# Patient Record
Sex: MALE | Race: WHITE
[De-identification: names, ages, dates, MRNs, and addresses within clinical notes are randomized per-mention and may not be internally consistent; named-entity substitution may affect disease eponyms.]

---

## 2017-01-03 ENCOUNTER — HOSPITAL ENCOUNTER (OUTPATIENT)
Dept: HOSPITAL 83 - RESCLI | Age: 62
Discharge: HOME | End: 2017-01-03
Attending: INTERNAL MEDICINE
Payer: COMMERCIAL

## 2017-01-03 DIAGNOSIS — K21.9: ICD-10-CM

## 2017-01-03 DIAGNOSIS — I10: Primary | ICD-10-CM

## 2017-01-03 DIAGNOSIS — M54.16: ICD-10-CM

## 2017-01-03 DIAGNOSIS — F32.9: ICD-10-CM

## 2017-01-03 DIAGNOSIS — M48.06: ICD-10-CM

## 2017-01-04 ENCOUNTER — HOSPITAL ENCOUNTER (OUTPATIENT)
Dept: HOSPITAL 83 - LAB | Age: 62
Discharge: HOME | End: 2017-01-04
Attending: INTERNAL MEDICINE
Payer: COMMERCIAL

## 2017-01-04 DIAGNOSIS — I10: ICD-10-CM

## 2017-01-04 DIAGNOSIS — K21.9: ICD-10-CM

## 2017-01-04 DIAGNOSIS — M48.06: Primary | ICD-10-CM

## 2017-01-04 DIAGNOSIS — M54.16: ICD-10-CM

## 2017-01-04 DIAGNOSIS — F32.9: ICD-10-CM

## 2017-01-04 LAB
ALBUMIN SERPL-MCNC: 3.6 GM/DL (ref 3.1–4.5)
ALP SERPL-CCNC: 88 U/L (ref 45–117)
ALT SERPL W P-5'-P-CCNC: 60 U/L (ref 12–78)
AST SERPL-CCNC: 72 IU/L (ref 3–35)
BASOPHILS # BLD AUTO: 0.3 10*3/UL (ref 0–0.1)
BASOPHILS # BLD AUTO: 2 % (ref 0–1)
BUN SERPL-MCNC: 24 MG/DL (ref 7–24)
CHLORIDE SERPL-SCNC: 105 MMOL/L (ref 98–107)
CHOLEST SERPL-MCNC: 231 MG/DL (ref ?–200)
CO2 SERPL-SCNC: 27 MMOL/L (ref 21–32)
EOSINOPHIL # BLD AUTO: 0.2 10*3/UL (ref 0–0.4)
EOSINOPHIL NFR BLD: 1 % (ref 1–4)
ERYTHROCYTE [DISTWIDTH] IN BLOOD BY AUTOMATED COUNT: 14.2 % (ref 0–14.5)
GLUCOSE SERPL-MCNC: 99 MG/DL (ref 65–99)
HCT VFR BLD AUTO: 44.9 % (ref 42–52)
HDLC SERPL-MCNC: 42 MG/DL (ref 40–60)
HGB BLD-MCNC: 14.8 G/DL (ref 14–18)
LDLC SERPL DIRECT ASSAY-MCNC: 135 MG/DL (ref 9–159)
LYMPHOCYTES # BLD AUTO: 4.8 10*3/UL (ref 1.3–4.4)
MCH RBC QN AUTO: 30.6 PG (ref 27–31)
MCHC RBC AUTO-ENTMCNC: 33 G/DL (ref 33–37)
MCV RBC AUTO: 92.8 FL (ref 80–94)
MONOCYTES # BLD AUTO: 0.8 10*3/UL (ref 0.1–1)
NEUTROPHILS # BLD AUTO: 9.9 10*3/UL (ref 2.3–7.9)
NEUTROPHILS NFR BLD AUTO: 62 % (ref 47–73)
NRBC BLD QL AUTO: 0 % (ref 0–0)
PLATELET # BLD AUTO: 328 10*3/UL (ref 130–400)
PLATELET SUFFICIENCY: NORMAL
PMV BLD AUTO: 9.7 FL (ref 9.6–12.3)
POTASSIUM SERPL-SCNC: 4.6 MMOL/L (ref 3.5–5.1)
PROT SERPL-MCNC: 8 GM/DL (ref 6.4–8.2)
RBC # BLD AUTO: 4.84 10*6/UL (ref 4.5–5.9)
RBC MORPH BLD: NORMAL
SODIUM SERPL-SCNC: 140 MMOL/L (ref 136–145)
TOTAL CELLS COUNTED: 100 #CELLS
TRIGL SERPL-MCNC: 268 MG/DL (ref ?–150)
VARIANT LYMPHS NFR BLD MANUAL: 1 % (ref 0–0)
VLDLC SERPL CALC-MCNC: 54 MG/DL (ref 6–40)
WBC NRBC COR # BLD AUTO: 16 10*3/UL (ref 4.8–10.8)

## 2017-04-03 ENCOUNTER — HOSPITAL ENCOUNTER (OUTPATIENT)
Dept: HOSPITAL 83 - LAB | Age: 62
Discharge: HOME | End: 2017-04-03
Attending: INTERNAL MEDICINE
Payer: COMMERCIAL

## 2017-04-03 DIAGNOSIS — R74.8: Primary | ICD-10-CM

## 2017-04-03 DIAGNOSIS — I10: ICD-10-CM

## 2017-04-03 LAB
ALBUMIN SERPL-MCNC: 3.7 GM/DL (ref 3.1–4.5)
ALP SERPL-CCNC: 87 U/L (ref 45–117)
ALT SERPL W P-5'-P-CCNC: 20 U/L (ref 12–78)
AST SERPL-CCNC: 14 IU/L (ref 3–35)
BUN SERPL-MCNC: 19 MG/DL (ref 7–24)
CHLORIDE SERPL-SCNC: 106 MMOL/L (ref 98–107)
CHOLEST SERPL-MCNC: 234 MG/DL (ref ?–200)
CHOLEST/HDLC SERPL: 5.6 {RATIO}
CO2 SERPL-SCNC: 26 MMOL/L (ref 21–32)
GLUCOSE SERPL-MCNC: 92 MG/DL (ref 65–99)
HDLC SERPL-MCNC: 42 MG/DL (ref 40–60)
LDLC SERPL DIRECT ASSAY-MCNC: 161 MG/DL (ref 9–159)
POTASSIUM SERPL-SCNC: 4.2 MMOL/L (ref 3.5–5.1)
PROT SERPL-MCNC: 7.7 GM/DL (ref 6.4–8.2)
SODIUM SERPL-SCNC: 138 MMOL/L (ref 136–145)
TRIGL SERPL-MCNC: 155 MG/DL (ref ?–150)
VLDLC SERPL CALC-MCNC: 31 MG/DL (ref 6–40)

## 2017-04-04 ENCOUNTER — HOSPITAL ENCOUNTER (OUTPATIENT)
Dept: HOSPITAL 83 - LAB | Age: 62
Discharge: HOME | End: 2017-04-04
Attending: EMERGENCY MEDICINE
Payer: COMMERCIAL

## 2017-04-04 DIAGNOSIS — R10.11: Primary | ICD-10-CM

## 2017-05-10 ENCOUNTER — HOSPITAL ENCOUNTER (OUTPATIENT)
Dept: HOSPITAL 83 - US | Age: 62
Discharge: HOME | End: 2017-05-10
Attending: INTERNAL MEDICINE
Payer: COMMERCIAL

## 2017-05-10 DIAGNOSIS — N28.1: ICD-10-CM

## 2017-05-10 DIAGNOSIS — I49.9: Primary | ICD-10-CM

## 2017-05-10 DIAGNOSIS — I10: ICD-10-CM

## 2017-06-27 ENCOUNTER — HOSPITAL ENCOUNTER (OUTPATIENT)
Dept: HOSPITAL 83 - RESCLI | Age: 62
Discharge: HOME | End: 2017-06-27
Attending: INTERNAL MEDICINE
Payer: COMMERCIAL

## 2017-06-27 DIAGNOSIS — M54.16: Primary | ICD-10-CM

## 2017-06-27 DIAGNOSIS — R55: ICD-10-CM

## 2017-06-27 DIAGNOSIS — E78.2: ICD-10-CM

## 2017-06-27 DIAGNOSIS — R79.89: ICD-10-CM

## 2017-06-27 DIAGNOSIS — Z88.2: ICD-10-CM

## 2017-06-27 DIAGNOSIS — Z72.0: ICD-10-CM

## 2017-06-27 DIAGNOSIS — I10: ICD-10-CM

## 2017-06-27 DIAGNOSIS — M50.20: ICD-10-CM

## 2017-06-28 ENCOUNTER — HOSPITAL ENCOUNTER (OUTPATIENT)
Dept: HOSPITAL 83 - MRI | Age: 62
Discharge: HOME | End: 2017-06-28
Attending: PSYCHIATRY & NEUROLOGY
Payer: COMMERCIAL

## 2017-06-28 DIAGNOSIS — M54.5: Primary | ICD-10-CM

## 2017-06-28 DIAGNOSIS — R20.0: ICD-10-CM

## 2017-06-28 DIAGNOSIS — M79.604: ICD-10-CM

## 2017-06-28 DIAGNOSIS — M79.605: ICD-10-CM

## 2017-12-12 ENCOUNTER — HOSPITAL ENCOUNTER (OUTPATIENT)
Dept: HOSPITAL 83 - RESCLI | Age: 62
Discharge: HOME | End: 2017-12-12
Attending: INTERNAL MEDICINE
Payer: MEDICARE

## 2017-12-12 DIAGNOSIS — I10: ICD-10-CM

## 2017-12-12 DIAGNOSIS — M51.26: ICD-10-CM

## 2017-12-12 DIAGNOSIS — R79.89: ICD-10-CM

## 2017-12-12 DIAGNOSIS — R26.2: ICD-10-CM

## 2017-12-12 DIAGNOSIS — R55: ICD-10-CM

## 2017-12-12 DIAGNOSIS — Z72.0: ICD-10-CM

## 2017-12-12 DIAGNOSIS — Z88.2: ICD-10-CM

## 2017-12-12 DIAGNOSIS — E78.2: ICD-10-CM

## 2017-12-12 DIAGNOSIS — M50.20: ICD-10-CM

## 2017-12-12 DIAGNOSIS — M54.16: Primary | ICD-10-CM

## 2017-12-12 DIAGNOSIS — Z71.6: ICD-10-CM

## 2017-12-18 ENCOUNTER — HOSPITAL ENCOUNTER (OUTPATIENT)
Dept: HOSPITAL 83 - LAB | Age: 62
Discharge: HOME | End: 2017-12-18
Attending: INTERNAL MEDICINE
Payer: MEDICARE

## 2017-12-18 DIAGNOSIS — M16.51: ICD-10-CM

## 2017-12-18 DIAGNOSIS — R79.89: ICD-10-CM

## 2017-12-18 DIAGNOSIS — M54.16: Primary | ICD-10-CM

## 2017-12-18 LAB
BUN SERPL-MCNC: 15 MG/DL (ref 7–24)
CHLORIDE SERPL-SCNC: 95 MMOL/L (ref 98–107)
CREAT SERPL-MCNC: 1.29 MG/DL (ref 0.7–1.3)
POTASSIUM SERPL-SCNC: 4.2 MMOL/L (ref 3.5–5.1)
SODIUM SERPL-SCNC: 130 MMOL/L (ref 136–145)

## 2018-04-28 ENCOUNTER — HOSPITAL ENCOUNTER (EMERGENCY)
Dept: HOSPITAL 83 - ED | Age: 63
Discharge: HOME | End: 2018-04-28
Payer: MEDICARE

## 2018-04-28 VITALS — BODY MASS INDEX: 24.33 KG/M2 | HEIGHT: 66.97 IN | WEIGHT: 155 LBS
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
## 2018-04-28 DIAGNOSIS — I10: ICD-10-CM
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
## 2018-04-28 DIAGNOSIS — Z88.0: ICD-10-CM

## 2018-04-28 DIAGNOSIS — Z79.899: ICD-10-CM

## 2018-04-28 DIAGNOSIS — R73.9: ICD-10-CM

## 2018-04-28 DIAGNOSIS — S20.362A: Primary | ICD-10-CM

## 2018-06-06 ENCOUNTER — HOSPITAL ENCOUNTER (OUTPATIENT)
Dept: HOSPITAL 83 - RESCLI | Age: 63
Discharge: HOME | End: 2018-06-06
Attending: INTERNAL MEDICINE
Payer: MEDICARE

## 2018-06-06 DIAGNOSIS — Z88.2: ICD-10-CM

## 2018-06-06 DIAGNOSIS — I10: Primary | ICD-10-CM

## 2018-06-06 DIAGNOSIS — F17.200: ICD-10-CM

## 2018-06-06 DIAGNOSIS — H61.21: ICD-10-CM

## 2018-06-06 DIAGNOSIS — F32.9: ICD-10-CM

## 2018-06-06 DIAGNOSIS — E78.2: ICD-10-CM

## 2018-06-06 DIAGNOSIS — K21.9: ICD-10-CM

## 2018-06-06 DIAGNOSIS — M54.16: ICD-10-CM

## 2018-06-11 ENCOUNTER — HOSPITAL ENCOUNTER (OUTPATIENT)
Dept: HOSPITAL 83 - MRI | Age: 63
Discharge: HOME | End: 2018-06-11
Attending: INTERNAL MEDICINE
Payer: MEDICARE

## 2018-06-11 DIAGNOSIS — R60.9: ICD-10-CM

## 2018-06-11 DIAGNOSIS — M19.031: Primary | ICD-10-CM

## 2018-06-26 ENCOUNTER — HOSPITAL ENCOUNTER (OUTPATIENT)
Dept: HOSPITAL 83 - RESCLI | Age: 63
Discharge: HOME | End: 2018-06-26
Attending: INTERNAL MEDICINE
Payer: MEDICARE

## 2018-06-26 DIAGNOSIS — F17.210: ICD-10-CM

## 2018-06-26 DIAGNOSIS — M15.9: ICD-10-CM

## 2018-06-26 DIAGNOSIS — K21.9: ICD-10-CM

## 2018-06-26 DIAGNOSIS — G62.9: ICD-10-CM

## 2018-06-26 DIAGNOSIS — M67.40: ICD-10-CM

## 2018-06-26 DIAGNOSIS — Z53.20: ICD-10-CM

## 2018-06-26 DIAGNOSIS — I10: Primary | ICD-10-CM

## 2018-06-26 DIAGNOSIS — Z88.2: ICD-10-CM

## 2018-06-26 DIAGNOSIS — M54.16: ICD-10-CM

## 2018-06-27 ENCOUNTER — HOSPITAL ENCOUNTER (OUTPATIENT)
Dept: HOSPITAL 83 - LAB | Age: 63
Discharge: HOME | End: 2018-06-27
Attending: ORTHOPAEDIC SURGERY
Payer: MEDICARE

## 2018-06-27 DIAGNOSIS — M19.031: Primary | ICD-10-CM

## 2018-06-27 DIAGNOSIS — G62.9: ICD-10-CM

## 2018-06-27 DIAGNOSIS — I10: ICD-10-CM

## 2018-06-27 LAB
ALBUMIN SERPL-MCNC: 3.9 GM/DL (ref 3.1–4.5)
ALP SERPL-CCNC: 94 U/L (ref 45–117)
ALT SERPL W P-5'-P-CCNC: 45 U/L (ref 12–78)
AST SERPL-CCNC: 28 IU/L (ref 3–35)
BUN SERPL-MCNC: 15 MG/DL (ref 7–24)
BURR CELLS BLD QL SMEAR: (no result)
CHLORIDE SERPL-SCNC: 105 MMOL/L (ref 98–107)
CHOLEST SERPL-MCNC: 251 MG/DL (ref ?–200)
CREAT SERPL-MCNC: 1.29 MG/DL (ref 0.7–1.3)
ERYTHROCYTE [DISTWIDTH] IN BLOOD BY AUTOMATED COUNT: 14 % (ref 0–14.5)
HCT VFR BLD AUTO: 45.5 % (ref 42–52)
HDLC SERPL-MCNC: 54 MG/DL (ref 40–60)
HGB BLD-MCNC: 15.4 G/DL (ref 14–18)
LDLC SERPL DIRECT ASSAY-MCNC: 174 MG/DL (ref 9–159)
MCH RBC QN AUTO: 30.7 PG (ref 27–31)
MCHC RBC AUTO-ENTMCNC: 33.8 G/DL (ref 33–37)
MCV RBC AUTO: 90.8 FL (ref 80–94)
NRBC BLD QL AUTO: 0 % (ref 0–0)
PLATELET # BLD AUTO: 352 10*3/UL (ref 130–400)
PLATELET SUFFICIENCY: NORMAL
PMV BLD AUTO: 9 FL (ref 9.6–12.3)
POTASSIUM SERPL-SCNC: 4.2 MMOL/L (ref 3.5–5.1)
PROT SERPL-MCNC: 8.4 GM/DL (ref 6.4–8.2)
RBC # BLD AUTO: 5.01 10*6/UL (ref 4.5–5.9)
SODIUM SERPL-SCNC: 137 MMOL/L (ref 136–145)
TOTAL CELLS COUNTED: 100 #CELLS
TRIGL SERPL-MCNC: 115 MG/DL (ref ?–150)
VLDLC SERPL CALC-MCNC: 23 MG/DL (ref 6–40)
WBC NRBC COR # BLD AUTO: 13.6 10*3/UL (ref 4.8–10.8)

## 2018-12-26 ENCOUNTER — HOSPITAL ENCOUNTER (INPATIENT)
Dept: HOSPITAL 83 - 4E | Age: 63
LOS: 2 days | Discharge: HOME | DRG: 871 | End: 2018-12-28
Payer: COMMERCIAL

## 2018-12-26 VITALS — DIASTOLIC BLOOD PRESSURE: 69 MMHG

## 2018-12-26 DIAGNOSIS — Z88.2: ICD-10-CM

## 2018-12-26 DIAGNOSIS — I10: ICD-10-CM

## 2018-12-26 DIAGNOSIS — Z90.49: ICD-10-CM

## 2018-12-26 DIAGNOSIS — A41.9: Primary | ICD-10-CM

## 2018-12-26 DIAGNOSIS — I70.0: ICD-10-CM

## 2018-12-26 DIAGNOSIS — Z71.6: ICD-10-CM

## 2018-12-26 DIAGNOSIS — Z80.8: ICD-10-CM

## 2018-12-26 DIAGNOSIS — R93.5: ICD-10-CM

## 2018-12-26 DIAGNOSIS — Z79.899: ICD-10-CM

## 2018-12-26 DIAGNOSIS — R10.9: ICD-10-CM

## 2018-12-26 DIAGNOSIS — Z51.5: ICD-10-CM

## 2018-12-26 DIAGNOSIS — Z66: ICD-10-CM

## 2018-12-26 DIAGNOSIS — K65.1: ICD-10-CM

## 2018-12-27 VITALS — DIASTOLIC BLOOD PRESSURE: 88 MMHG | SYSTOLIC BLOOD PRESSURE: 179 MMHG

## 2018-12-27 VITALS — SYSTOLIC BLOOD PRESSURE: 138 MMHG | DIASTOLIC BLOOD PRESSURE: 87 MMHG

## 2018-12-27 VITALS — SYSTOLIC BLOOD PRESSURE: 155 MMHG | DIASTOLIC BLOOD PRESSURE: 62 MMHG

## 2018-12-27 VITALS — DIASTOLIC BLOOD PRESSURE: 64 MMHG | SYSTOLIC BLOOD PRESSURE: 148 MMHG

## 2018-12-27 VITALS — SYSTOLIC BLOOD PRESSURE: 155 MMHG | DIASTOLIC BLOOD PRESSURE: 73 MMHG

## 2018-12-27 VITALS — DIASTOLIC BLOOD PRESSURE: 78 MMHG

## 2018-12-27 VITALS — SYSTOLIC BLOOD PRESSURE: 147 MMHG | DIASTOLIC BLOOD PRESSURE: 79 MMHG

## 2018-12-27 VITALS — DIASTOLIC BLOOD PRESSURE: 103 MMHG

## 2018-12-27 VITALS — DIASTOLIC BLOOD PRESSURE: 64 MMHG

## 2018-12-27 VITALS — SYSTOLIC BLOOD PRESSURE: 151 MMHG | DIASTOLIC BLOOD PRESSURE: 76 MMHG

## 2018-12-27 VITALS — SYSTOLIC BLOOD PRESSURE: 149 MMHG | DIASTOLIC BLOOD PRESSURE: 66 MMHG

## 2018-12-27 LAB
BASOPHILS # BLD AUTO: 0.1 10*3/UL (ref 0–0.1)
BASOPHILS NFR BLD AUTO: 0.5 % (ref 0–1)
BUN SERPL-MCNC: 14 MG/DL (ref 7–24)
CHLORIDE SERPL-SCNC: 106 MMOL/L (ref 98–107)
CREAT SERPL-MCNC: 1.34 MG/DL (ref 0.7–1.3)
EOSINOPHIL # BLD AUTO: 0.1 10*3/UL (ref 0–0.4)
EOSINOPHIL # BLD AUTO: 0.6 % (ref 1–4)
ERYTHROCYTE [DISTWIDTH] IN BLOOD BY AUTOMATED COUNT: 14 % (ref 0–14.5)
HCT VFR BLD AUTO: 38.2 % (ref 42–52)
HGB BLD-MCNC: 12.7 G/DL (ref 14–18)
LYMPHOCYTES # BLD AUTO: 4 10*3/UL (ref 1.3–4.4)
LYMPHOCYTES NFR BLD AUTO: 21.4 % (ref 27–41)
MCH RBC QN AUTO: 31 PG (ref 27–31)
MCHC RBC AUTO-ENTMCNC: 33.2 G/DL (ref 33–37)
MCV RBC AUTO: 93.2 FL (ref 80–94)
MONOCYTES # BLD AUTO: 1.2 10*3/UL (ref 0.1–1)
MONOCYTES NFR BLD MANUAL: 6.1 % (ref 3–9)
NEUT #: 13.4 10*3/UL (ref 2.3–7.9)
NEUT %: 71 % (ref 47–73)
NRBC BLD QL AUTO: 0 % (ref 0–0)
PLATELET # BLD AUTO: 264 10*3/UL (ref 130–400)
PMV BLD AUTO: 10.3 FL (ref 9.6–12.3)
POTASSIUM SERPL-SCNC: 3.7 MMOL/L (ref 3.5–5.1)
RBC # BLD AUTO: 4.1 10*6/UL (ref 4.5–5.9)
SODIUM SERPL-SCNC: 137 MMOL/L (ref 136–145)
WBC NRBC COR # BLD AUTO: 18.8 10*3/UL (ref 4.8–10.8)

## 2018-12-27 PROCEDURE — 0W9G4ZZ DRAINAGE OF PERITONEAL CAVITY, PERCUTANEOUS ENDOSCOPIC APPROACH: ICD-10-PCS

## 2018-12-28 VITALS — DIASTOLIC BLOOD PRESSURE: 60 MMHG

## 2018-12-28 VITALS — DIASTOLIC BLOOD PRESSURE: 62 MMHG | SYSTOLIC BLOOD PRESSURE: 130 MMHG

## 2018-12-28 VITALS — SYSTOLIC BLOOD PRESSURE: 127 MMHG | DIASTOLIC BLOOD PRESSURE: 45 MMHG

## 2018-12-28 LAB
BASOPHILS # BLD AUTO: 0 10*3/UL (ref 0–0.1)
BASOPHILS NFR BLD AUTO: 0.2 % (ref 0–1)
BUN SERPL-MCNC: 18 MG/DL (ref 7–24)
CHLORIDE SERPL-SCNC: 109 MMOL/L (ref 98–107)
CREAT SERPL-MCNC: 1.48 MG/DL (ref 0.7–1.3)
EOSINOPHIL # BLD AUTO: 0 % (ref 1–4)
EOSINOPHIL # BLD AUTO: 0 10*3/UL (ref 0–0.4)
ERYTHROCYTE [DISTWIDTH] IN BLOOD BY AUTOMATED COUNT: 14.2 % (ref 0–14.5)
HCT VFR BLD AUTO: 34.7 % (ref 42–52)
HGB BLD-MCNC: 11.9 G/DL (ref 14–18)
LYMPHOCYTES # BLD AUTO: 1.8 10*3/UL (ref 1.3–4.4)
LYMPHOCYTES NFR BLD AUTO: 9.7 % (ref 27–41)
MCH RBC QN AUTO: 31.7 PG (ref 27–31)
MCHC RBC AUTO-ENTMCNC: 34.3 G/DL (ref 33–37)
MCV RBC AUTO: 92.5 FL (ref 80–94)
MONOCYTES # BLD AUTO: 0.5 10*3/UL (ref 0.1–1)
MONOCYTES NFR BLD MANUAL: 2.7 % (ref 3–9)
NEUT #: 16.3 10*3/UL (ref 2.3–7.9)
NEUT %: 86.8 % (ref 47–73)
NRBC BLD QL AUTO: 0 % (ref 0–0)
PLATELET # BLD AUTO: 241 10*3/UL (ref 130–400)
PMV BLD AUTO: 10 FL (ref 9.6–12.3)
POTASSIUM SERPL-SCNC: 4 MMOL/L (ref 3.5–5.1)
RBC # BLD AUTO: 3.75 10*6/UL (ref 4.5–5.9)
SODIUM SERPL-SCNC: 139 MMOL/L (ref 136–145)
WBC NRBC COR # BLD AUTO: 18.8 10*3/UL (ref 4.8–10.8)

## 2019-01-10 ENCOUNTER — HOSPITAL ENCOUNTER (OUTPATIENT)
Dept: HOSPITAL 83 - RESCLI | Age: 64
Discharge: HOME | End: 2019-01-10
Attending: STUDENT IN AN ORGANIZED HEALTH CARE EDUCATION/TRAINING PROGRAM
Payer: COMMERCIAL

## 2019-01-10 DIAGNOSIS — Z79.899: ICD-10-CM

## 2019-01-10 DIAGNOSIS — F17.210: ICD-10-CM

## 2019-01-10 DIAGNOSIS — Z88.2: ICD-10-CM

## 2019-01-10 DIAGNOSIS — I10: ICD-10-CM

## 2019-01-10 DIAGNOSIS — R10.31: Primary | ICD-10-CM

## 2019-01-11 ENCOUNTER — HOSPITAL ENCOUNTER (INPATIENT)
Dept: HOSPITAL 83 - ED | Age: 64
LOS: 5 days | Discharge: LEFT BEFORE DISCHARGE | DRG: 853 | End: 2019-01-16
Attending: INTERNAL MEDICINE | Admitting: INTERNAL MEDICINE
Payer: COMMERCIAL

## 2019-01-11 VITALS — SYSTOLIC BLOOD PRESSURE: 139 MMHG | DIASTOLIC BLOOD PRESSURE: 62 MMHG

## 2019-01-11 VITALS — DIASTOLIC BLOOD PRESSURE: 58 MMHG

## 2019-01-11 VITALS — DIASTOLIC BLOOD PRESSURE: 62 MMHG

## 2019-01-11 VITALS — SYSTOLIC BLOOD PRESSURE: 145 MMHG | DIASTOLIC BLOOD PRESSURE: 61 MMHG

## 2019-01-11 VITALS — SYSTOLIC BLOOD PRESSURE: 160 MMHG | DIASTOLIC BLOOD PRESSURE: 69 MMHG

## 2019-01-11 VITALS — SYSTOLIC BLOOD PRESSURE: 130 MMHG | DIASTOLIC BLOOD PRESSURE: 56 MMHG

## 2019-01-11 DIAGNOSIS — Z88.2: ICD-10-CM

## 2019-01-11 DIAGNOSIS — R10.9: ICD-10-CM

## 2019-01-11 DIAGNOSIS — Z53.21: ICD-10-CM

## 2019-01-11 DIAGNOSIS — I70.0: ICD-10-CM

## 2019-01-11 DIAGNOSIS — N18.3: ICD-10-CM

## 2019-01-11 DIAGNOSIS — E78.00: ICD-10-CM

## 2019-01-11 DIAGNOSIS — F12.10: ICD-10-CM

## 2019-01-11 DIAGNOSIS — K55.9: ICD-10-CM

## 2019-01-11 DIAGNOSIS — Z79.899: ICD-10-CM

## 2019-01-11 DIAGNOSIS — E87.1: ICD-10-CM

## 2019-01-11 DIAGNOSIS — A41.9: Primary | ICD-10-CM

## 2019-01-11 DIAGNOSIS — D12.0: ICD-10-CM

## 2019-01-11 DIAGNOSIS — R73.9: ICD-10-CM

## 2019-01-11 DIAGNOSIS — Z90.49: ICD-10-CM

## 2019-01-11 DIAGNOSIS — Z80.7: ICD-10-CM

## 2019-01-11 DIAGNOSIS — I12.9: ICD-10-CM

## 2019-01-11 DIAGNOSIS — Z72.0: ICD-10-CM

## 2019-01-11 DIAGNOSIS — K57.30: ICD-10-CM

## 2019-01-11 DIAGNOSIS — N17.0: ICD-10-CM

## 2019-01-11 DIAGNOSIS — Z82.49: ICD-10-CM

## 2019-01-11 DIAGNOSIS — E44.0: ICD-10-CM

## 2019-01-11 DIAGNOSIS — Z71.6: ICD-10-CM

## 2019-01-11 DIAGNOSIS — B99.9: ICD-10-CM

## 2019-01-11 LAB
ALBUMIN SERPL-MCNC: 3.4 GM/DL (ref 3.1–4.5)
ALP SERPL-CCNC: 98 U/L (ref 45–117)
ALT SERPL W P-5'-P-CCNC: 38 U/L (ref 12–78)
APPEARANCE UR: CLEAR
APTT PPP: 28.4 SECONDS (ref 20.8–31.5)
AST SERPL-CCNC: 28 IU/L (ref 3–35)
BILIRUB UR QL STRIP: NEGATIVE
BUN SERPL-MCNC: 21 MG/DL (ref 7–24)
BURR CELLS BLD QL SMEAR: (no result)
CHLORIDE SERPL-SCNC: 98 MMOL/L (ref 98–107)
COLOR UR: YELLOW
CREAT SERPL-MCNC: 1.69 MG/DL (ref 0.7–1.3)
ERYTHROCYTE [DISTWIDTH] IN BLOOD BY AUTOMATED COUNT: 13.8 % (ref 0–14.5)
GLUCOSE UR QL: NEGATIVE
HCT VFR BLD AUTO: 40.3 % (ref 42–52)
HGB BLD-MCNC: 13.8 G/DL (ref 14–18)
HGB UR QL STRIP: (no result)
INR BLD: 1 (ref 2–3.5)
KETONES UR QL STRIP: (no result)
LEUKOCYTE ESTERASE UR QL STRIP: NEGATIVE
LIPASE SERPL-CCNC: 118 U/L (ref 73–393)
MCH RBC QN AUTO: 31.2 PG (ref 27–31)
MCHC RBC AUTO-ENTMCNC: 34.2 G/DL (ref 33–37)
MCV RBC AUTO: 91.2 FL (ref 80–94)
NITRITE UR QL STRIP: NEGATIVE
NRBC BLD QL AUTO: 0 10*3/UL (ref 0–0)
PH UR STRIP: 5.5 [PH] (ref 5–9)
PLATELET # BLD AUTO: 467 10*3/UL (ref 130–400)
PLATELET SUFFICIENCY: (no result)
PMV BLD AUTO: 9.4 FL (ref 9.6–12.3)
POTASSIUM SERPL-SCNC: 3.7 MMOL/L (ref 3.5–5.1)
PROT SERPL-MCNC: 8.7 GM/DL (ref 6.4–8.2)
RBC # BLD AUTO: 4.42 10*6/UL (ref 4.5–5.9)
SODIUM SERPL-SCNC: 131 MMOL/L (ref 136–145)
SP GR UR: <= 1.005 (ref 1–1.03)
TOTAL CELLS COUNTED: 100 #CELLS
UROBILINOGEN UR STRIP-MCNC: 0.2 E.U./DL (ref 0.2–1)
WBC #/AREA URNS HPF: (no result) WBC/HPF (ref 0–5)
WBC NRBC COR # BLD AUTO: 23.2 10*3/UL (ref 4.8–10.8)

## 2019-01-12 VITALS — SYSTOLIC BLOOD PRESSURE: 172 MMHG | DIASTOLIC BLOOD PRESSURE: 64 MMHG

## 2019-01-12 VITALS — DIASTOLIC BLOOD PRESSURE: 62 MMHG | SYSTOLIC BLOOD PRESSURE: 104 MMHG

## 2019-01-12 VITALS — DIASTOLIC BLOOD PRESSURE: 58 MMHG

## 2019-01-12 VITALS — DIASTOLIC BLOOD PRESSURE: 60 MMHG

## 2019-01-12 VITALS — SYSTOLIC BLOOD PRESSURE: 146 MMHG | DIASTOLIC BLOOD PRESSURE: 61 MMHG

## 2019-01-12 VITALS — DIASTOLIC BLOOD PRESSURE: 64 MMHG | SYSTOLIC BLOOD PRESSURE: 135 MMHG

## 2019-01-12 VITALS — DIASTOLIC BLOOD PRESSURE: 61 MMHG

## 2019-01-12 LAB
ALBUMIN SERPL-MCNC: 2.8 GM/DL (ref 3.1–4.5)
ALP SERPL-CCNC: 84 U/L (ref 45–117)
ALT SERPL W P-5'-P-CCNC: 31 U/L (ref 12–78)
AST SERPL-CCNC: 12 IU/L (ref 3–35)
BUN SERPL-MCNC: 16 MG/DL (ref 7–24)
BURR CELLS BLD QL SMEAR: (no result)
CHLORIDE SERPL-SCNC: 105 MMOL/L (ref 98–107)
CREAT SERPL-MCNC: 1.39 MG/DL (ref 0.7–1.3)
ERYTHROCYTE [DISTWIDTH] IN BLOOD BY AUTOMATED COUNT: 14.2 % (ref 0–14.5)
HCT VFR BLD AUTO: 36 % (ref 42–52)
HGB BLD-MCNC: 12.1 G/DL (ref 14–18)
MCH RBC QN AUTO: 30.9 PG (ref 27–31)
MCHC RBC AUTO-ENTMCNC: 33.6 G/DL (ref 33–37)
MCV RBC AUTO: 92.1 FL (ref 80–94)
NRBC BLD QL AUTO: 0 10*3/UL (ref 0–0)
PHOSPHATE SERPL-MCNC: 3.5 MG/DL (ref 2.5–4.9)
PLATELET # BLD AUTO: 442 10*3/UL (ref 130–400)
PLATELET SUFFICIENCY: (no result)
PMV BLD AUTO: 9.9 FL (ref 9.6–12.3)
POTASSIUM SERPL-SCNC: 3.8 MMOL/L (ref 3.5–5.1)
PROT SERPL-MCNC: 7.4 GM/DL (ref 6.4–8.2)
RBC # BLD AUTO: 3.91 10*6/UL (ref 4.5–5.9)
SODIUM SERPL-SCNC: 137 MMOL/L (ref 136–145)
TOTAL CELLS COUNTED: 100 #CELLS
WBC NRBC COR # BLD AUTO: 19.2 10*3/UL (ref 4.8–10.8)

## 2019-01-13 VITALS — DIASTOLIC BLOOD PRESSURE: 84 MMHG

## 2019-01-13 VITALS — SYSTOLIC BLOOD PRESSURE: 142 MMHG | DIASTOLIC BLOOD PRESSURE: 60 MMHG

## 2019-01-13 VITALS — DIASTOLIC BLOOD PRESSURE: 71 MMHG

## 2019-01-13 VITALS — DIASTOLIC BLOOD PRESSURE: 64 MMHG

## 2019-01-13 VITALS — DIASTOLIC BLOOD PRESSURE: 67 MMHG

## 2019-01-13 LAB
ACANTHOCYTES BLD QL SMEAR: (no result)
BUN SERPL-MCNC: 16 MG/DL (ref 7–24)
CHLORIDE SERPL-SCNC: 103 MMOL/L (ref 98–107)
CREAT SERPL-MCNC: 1.02 MG/DL (ref 0.7–1.3)
ERYTHROCYTE [DISTWIDTH] IN BLOOD BY AUTOMATED COUNT: 14.1 % (ref 0–14.5)
HCT VFR BLD AUTO: 35.8 % (ref 42–52)
HGB BLD-MCNC: 11.9 G/DL (ref 14–18)
MCH RBC QN AUTO: 30.6 PG (ref 27–31)
MCHC RBC AUTO-ENTMCNC: 33.2 G/DL (ref 33–37)
MCV RBC AUTO: 92 FL (ref 80–94)
NRBC BLD QL AUTO: 0 10*3/UL (ref 0–0)
PLATELET # BLD AUTO: 418 10*3/UL (ref 130–400)
PLATELET SUFFICIENCY: (no result)
PMV BLD AUTO: 10 FL (ref 9.6–12.3)
POTASSIUM SERPL-SCNC: 3.6 MMOL/L (ref 3.5–5.1)
RBC # BLD AUTO: 3.89 10*6/UL (ref 4.5–5.9)
SODIUM SERPL-SCNC: 136 MMOL/L (ref 136–145)
TOTAL CELLS COUNTED: 100 #CELLS
WBC NRBC COR # BLD AUTO: 17 10*3/UL (ref 4.8–10.8)

## 2019-01-14 VITALS — SYSTOLIC BLOOD PRESSURE: 185 MMHG | DIASTOLIC BLOOD PRESSURE: 73 MMHG

## 2019-01-14 VITALS — DIASTOLIC BLOOD PRESSURE: 62 MMHG | SYSTOLIC BLOOD PRESSURE: 166 MMHG

## 2019-01-14 VITALS — DIASTOLIC BLOOD PRESSURE: 72 MMHG | SYSTOLIC BLOOD PRESSURE: 172 MMHG

## 2019-01-14 VITALS — DIASTOLIC BLOOD PRESSURE: 72 MMHG

## 2019-01-14 VITALS — DIASTOLIC BLOOD PRESSURE: 62 MMHG | SYSTOLIC BLOOD PRESSURE: 144 MMHG

## 2019-01-14 VITALS — DIASTOLIC BLOOD PRESSURE: 65 MMHG | SYSTOLIC BLOOD PRESSURE: 161 MMHG

## 2019-01-14 VITALS — DIASTOLIC BLOOD PRESSURE: 70 MMHG

## 2019-01-14 VITALS — DIASTOLIC BLOOD PRESSURE: 75 MMHG | SYSTOLIC BLOOD PRESSURE: 178 MMHG

## 2019-01-14 VITALS — DIASTOLIC BLOOD PRESSURE: 56 MMHG | SYSTOLIC BLOOD PRESSURE: 125 MMHG

## 2019-01-14 VITALS — DIASTOLIC BLOOD PRESSURE: 60 MMHG

## 2019-01-14 LAB
BASOPHILS # BLD AUTO: 0.1 10*3/UL (ref 0–0.1)
BASOPHILS NFR BLD AUTO: 0.4 % (ref 0–1)
BUN SERPL-MCNC: 15 MG/DL (ref 7–24)
CHLORIDE SERPL-SCNC: 101 MMOL/L (ref 98–107)
CREAT SERPL-MCNC: 1.17 MG/DL (ref 0.7–1.3)
EOSINOPHIL # BLD AUTO: 0.3 10*3/UL (ref 0–0.4)
EOSINOPHIL # BLD AUTO: 2 % (ref 1–4)
ERYTHROCYTE [DISTWIDTH] IN BLOOD BY AUTOMATED COUNT: 13.8 % (ref 0–14.5)
HCT VFR BLD AUTO: 35.7 % (ref 42–52)
HGB BLD-MCNC: 11.9 G/DL (ref 14–18)
LYMPHOCYTES # BLD AUTO: 3.6 10*3/UL (ref 1.3–4.4)
LYMPHOCYTES NFR BLD AUTO: 26.3 % (ref 27–41)
MCH RBC QN AUTO: 31.2 PG (ref 27–31)
MCHC RBC AUTO-ENTMCNC: 33.3 G/DL (ref 33–37)
MCV RBC AUTO: 93.5 FL (ref 80–94)
MONOCYTES # BLD AUTO: 1.3 10*3/UL (ref 0.1–1)
MONOCYTES NFR BLD MANUAL: 9.5 % (ref 3–9)
NEUT #: 8.5 10*3/UL (ref 2.3–7.9)
NEUT %: 61.6 % (ref 47–73)
NRBC BLD QL AUTO: 0 10*3/UL (ref 0–0)
PLATELET # BLD AUTO: 441 10*3/UL (ref 130–400)
PMV BLD AUTO: 10.1 FL (ref 9.6–12.3)
POTASSIUM SERPL-SCNC: 3.9 MMOL/L (ref 3.5–5.1)
RBC # BLD AUTO: 3.82 10*6/UL (ref 4.5–5.9)
SODIUM SERPL-SCNC: 137 MMOL/L (ref 136–145)
WBC NRBC COR # BLD AUTO: 13.8 10*3/UL (ref 4.8–10.8)

## 2019-01-15 VITALS — DIASTOLIC BLOOD PRESSURE: 70 MMHG

## 2019-01-15 VITALS — DIASTOLIC BLOOD PRESSURE: 62 MMHG

## 2019-01-15 VITALS — DIASTOLIC BLOOD PRESSURE: 58 MMHG

## 2019-01-15 VITALS — DIASTOLIC BLOOD PRESSURE: 72 MMHG

## 2019-01-15 VITALS — DIASTOLIC BLOOD PRESSURE: 66 MMHG

## 2019-01-15 LAB
BASOPHILS # BLD AUTO: 0 10*3/UL (ref 0–0.1)
BASOPHILS NFR BLD AUTO: 0.1 % (ref 0–1)
BUN SERPL-MCNC: 21 MG/DL (ref 7–24)
CHLORIDE SERPL-SCNC: 101 MMOL/L (ref 98–107)
CREAT SERPL-MCNC: 1.13 MG/DL (ref 0.7–1.3)
EOSINOPHIL # BLD AUTO: 0 % (ref 1–4)
EOSINOPHIL # BLD AUTO: 0 10*3/UL (ref 0–0.4)
ERYTHROCYTE [DISTWIDTH] IN BLOOD BY AUTOMATED COUNT: 13.4 % (ref 0–14.5)
HCT VFR BLD AUTO: 32.7 % (ref 42–52)
HGB BLD-MCNC: 11 G/DL (ref 14–18)
LYMPHOCYTES # BLD AUTO: 2.9 10*3/UL (ref 1.3–4.4)
LYMPHOCYTES NFR BLD AUTO: 17.9 % (ref 27–41)
MCH RBC QN AUTO: 31.1 PG (ref 27–31)
MCHC RBC AUTO-ENTMCNC: 33.6 G/DL (ref 33–37)
MCV RBC AUTO: 92.4 FL (ref 80–94)
MONOCYTES # BLD AUTO: 1.1 10*3/UL (ref 0.1–1)
MONOCYTES NFR BLD MANUAL: 7.1 % (ref 3–9)
NEUT #: 11.8 10*3/UL (ref 2.3–7.9)
NEUT %: 74.4 % (ref 47–73)
NRBC BLD QL AUTO: 0 10*3/UL (ref 0–0)
PLATELET # BLD AUTO: 398 10*3/UL (ref 130–400)
PMV BLD AUTO: 9.7 FL (ref 9.6–12.3)
POTASSIUM SERPL-SCNC: 4 MMOL/L (ref 3.5–5.1)
RBC # BLD AUTO: 3.54 10*6/UL (ref 4.5–5.9)
SODIUM SERPL-SCNC: 135 MMOL/L (ref 136–145)
WBC NRBC COR # BLD AUTO: 15.9 10*3/UL (ref 4.8–10.8)

## 2019-01-16 VITALS — DIASTOLIC BLOOD PRESSURE: 80 MMHG

## 2019-01-16 VITALS — DIASTOLIC BLOOD PRESSURE: 69 MMHG

## 2019-01-24 ENCOUNTER — HOSPITAL ENCOUNTER (OUTPATIENT)
Dept: HOSPITAL 83 - RESCLI | Age: 64
Discharge: HOME | End: 2019-01-24
Attending: INTERNAL MEDICINE
Payer: COMMERCIAL

## 2019-01-24 DIAGNOSIS — I10: ICD-10-CM

## 2019-01-24 DIAGNOSIS — Z71.6: ICD-10-CM

## 2019-01-24 DIAGNOSIS — E78.2: ICD-10-CM

## 2019-01-24 DIAGNOSIS — F32.9: ICD-10-CM

## 2019-01-24 DIAGNOSIS — M15.9: ICD-10-CM

## 2019-01-24 DIAGNOSIS — R26.2: ICD-10-CM

## 2019-01-24 DIAGNOSIS — Z79.899: ICD-10-CM

## 2019-01-24 DIAGNOSIS — Z88.8: ICD-10-CM

## 2019-01-24 DIAGNOSIS — F12.10: ICD-10-CM

## 2019-01-24 DIAGNOSIS — K21.9: Primary | ICD-10-CM

## 2019-01-24 DIAGNOSIS — J18.1: ICD-10-CM

## 2019-01-24 DIAGNOSIS — Z88.2: ICD-10-CM

## 2019-01-24 DIAGNOSIS — Z90.49: ICD-10-CM

## 2019-01-24 DIAGNOSIS — F17.200: ICD-10-CM

## 2019-02-05 ENCOUNTER — HOSPITAL ENCOUNTER (OUTPATIENT)
Dept: HOSPITAL 83 - RAD | Age: 64
Discharge: HOME | End: 2019-02-05
Attending: INTERNAL MEDICINE
Payer: COMMERCIAL

## 2019-02-05 DIAGNOSIS — J44.9: Primary | ICD-10-CM

## 2019-02-05 DIAGNOSIS — F17.200: ICD-10-CM

## 2019-02-05 DIAGNOSIS — J18.1: ICD-10-CM

## 2019-07-23 ENCOUNTER — OFFICE VISIT (OUTPATIENT)
Dept: CARDIOLOGY CLINIC | Age: 64
End: 2019-07-23
Payer: MEDICARE

## 2019-07-23 VITALS
BODY MASS INDEX: 24.17 KG/M2 | RESPIRATION RATE: 18 BRPM | HEART RATE: 78 BPM | WEIGHT: 154 LBS | HEIGHT: 67 IN | SYSTOLIC BLOOD PRESSURE: 140 MMHG | DIASTOLIC BLOOD PRESSURE: 68 MMHG

## 2019-07-23 DIAGNOSIS — G89.29 CHRONIC PAIN OF RIGHT HIP: ICD-10-CM

## 2019-07-23 DIAGNOSIS — I10 ESSENTIAL HYPERTENSION: Primary | ICD-10-CM

## 2019-07-23 DIAGNOSIS — M25.551 CHRONIC PAIN OF RIGHT HIP: ICD-10-CM

## 2019-07-23 DIAGNOSIS — Z72.0 TOBACCO ABUSE: ICD-10-CM

## 2019-07-23 DIAGNOSIS — N28.1 RENAL CYST, LEFT: ICD-10-CM

## 2019-07-23 DIAGNOSIS — K21.9 GASTROESOPHAGEAL REFLUX DISEASE WITHOUT ESOPHAGITIS: ICD-10-CM

## 2019-07-23 PROCEDURE — G8420 CALC BMI NORM PARAMETERS: HCPCS | Performed by: INTERNAL MEDICINE

## 2019-07-23 PROCEDURE — G8427 DOCREV CUR MEDS BY ELIG CLIN: HCPCS | Performed by: INTERNAL MEDICINE

## 2019-07-23 PROCEDURE — 99214 OFFICE O/P EST MOD 30 MIN: CPT | Performed by: INTERNAL MEDICINE

## 2019-07-23 PROCEDURE — 3017F COLORECTAL CA SCREEN DOC REV: CPT | Performed by: INTERNAL MEDICINE

## 2019-07-23 PROCEDURE — 4004F PT TOBACCO SCREEN RCVD TLK: CPT | Performed by: INTERNAL MEDICINE

## 2019-07-23 PROCEDURE — 93000 ELECTROCARDIOGRAM COMPLETE: CPT | Performed by: INTERNAL MEDICINE

## 2019-07-23 RX ORDER — OMEPRAZOLE 40 MG/1
1 CAPSULE, DELAYED RELEASE ORAL DAILY
Refills: 0 | COMMUNITY
Start: 2019-07-11

## 2019-07-23 NOTE — PROGRESS NOTES
pain    Preventive Cardiology: Low cholesterol diet, regular exercise as tolerate, and gradual weight loss discussed. Monitor BP and heart rates. All questions answered about cardiac diagnoses and cardiac medications. Continue current medications. Compliance with medications and f/u with all physicians discussed. Risk factor modification based on risk profile discussed. Call if any exertional chest pain, short of breath, dizzy or palpitations   Follow up in 12 months or earlier if needed.    Consider stress test next year      [facility]  [address], [place], [address] [facility]  [phone]

## 2019-10-30 ENCOUNTER — HOSPITAL ENCOUNTER (OUTPATIENT)
Dept: HOSPITAL 83 - RESCLI | Age: 64
Discharge: HOME | End: 2019-10-30
Attending: STUDENT IN AN ORGANIZED HEALTH CARE EDUCATION/TRAINING PROGRAM
Payer: COMMERCIAL

## 2019-10-30 DIAGNOSIS — I10: ICD-10-CM

## 2019-10-30 DIAGNOSIS — Z23: Primary | ICD-10-CM

## 2019-10-30 DIAGNOSIS — K21.9: ICD-10-CM

## 2019-10-30 DIAGNOSIS — Z79.899: ICD-10-CM

## 2019-10-30 DIAGNOSIS — R42: ICD-10-CM

## 2019-10-30 LAB
ALBUMIN SERPL-MCNC: 3.9 GM/DL (ref 3.1–4.5)
ALP SERPL-CCNC: 77 U/L (ref 45–117)
ALT SERPL W P-5'-P-CCNC: 24 U/L (ref 12–78)
AST SERPL-CCNC: 18 IU/L (ref 3–35)
BUN SERPL-MCNC: 17 MG/DL (ref 7–24)
CHLORIDE SERPL-SCNC: 97 MMOL/L (ref 98–107)
CREAT SERPL-MCNC: 1.12 MG/DL (ref 0.7–1.3)
ERYTHROCYTE [DISTWIDTH] IN BLOOD BY AUTOMATED COUNT: 14 % (ref 0–14.5)
HCT VFR BLD AUTO: 41.9 % (ref 42–52)
HGB BLD-MCNC: 14.2 G/DL (ref 14–18)
MCH RBC QN AUTO: 31.2 PG (ref 27–31)
MCHC RBC AUTO-ENTMCNC: 33.9 G/DL (ref 33–37)
MCV RBC AUTO: 92.1 FL (ref 80–94)
NRBC BLD QL AUTO: 0 10*3/UL (ref 0–0)
PHOSPHATE SERPL-MCNC: 3.8 MG/DL (ref 2.5–4.9)
PLATELET # BLD AUTO: 349 10*3/UL (ref 130–400)
PLATELET SUFFICIENCY: NORMAL
PMV BLD AUTO: 9.1 FL (ref 9.6–12.3)
POTASSIUM SERPL-SCNC: 3.8 MMOL/L (ref 3.5–5.1)
PROT SERPL-MCNC: 8 GM/DL (ref 6.4–8.2)
RBC # BLD AUTO: 4.55 10*6/UL (ref 4.5–5.9)
RBC MORPH BLD: NORMAL
SODIUM SERPL-SCNC: 130 MMOL/L (ref 136–145)
TOTAL CELLS COUNTED: 100 #CELLS
TROPONIN I SERPL-MCNC: < 0.015 NG/ML (ref ?–0.04)
WBC NRBC COR # BLD AUTO: 13.7 10*3/UL (ref 4.8–10.8)

## 2019-11-01 ENCOUNTER — HOSPITAL ENCOUNTER (OUTPATIENT)
Dept: HOSPITAL 83 - RESCLI | Age: 64
Discharge: HOME | End: 2019-11-01
Attending: INTERNAL MEDICINE
Payer: COMMERCIAL

## 2019-11-01 DIAGNOSIS — M25.551: ICD-10-CM

## 2019-11-01 DIAGNOSIS — K21.9: ICD-10-CM

## 2019-11-01 DIAGNOSIS — Z79.899: ICD-10-CM

## 2019-11-01 DIAGNOSIS — I10: Primary | ICD-10-CM

## 2019-11-13 ENCOUNTER — HOSPITAL ENCOUNTER (OUTPATIENT)
Dept: HOSPITAL 83 - MRI | Age: 64
Discharge: HOME | End: 2019-11-13
Attending: INTERNAL MEDICINE
Payer: COMMERCIAL

## 2019-11-13 DIAGNOSIS — M54.5: ICD-10-CM

## 2019-11-13 DIAGNOSIS — M16.0: Primary | ICD-10-CM

## 2019-11-13 DIAGNOSIS — R20.0: ICD-10-CM

## 2019-11-27 ENCOUNTER — HOSPITAL ENCOUNTER (OUTPATIENT)
Dept: HOSPITAL 83 - CARD | Age: 64
Discharge: HOME | End: 2019-11-27
Attending: INTERNAL MEDICINE
Payer: COMMERCIAL

## 2019-11-27 DIAGNOSIS — Z79.899: ICD-10-CM

## 2019-11-27 DIAGNOSIS — R42: Primary | ICD-10-CM

## 2020-02-11 ENCOUNTER — HOSPITAL ENCOUNTER (OUTPATIENT)
Dept: HOSPITAL 83 - RESCLI | Age: 65
Discharge: HOME | End: 2020-02-11
Attending: INTERNAL MEDICINE
Payer: COMMERCIAL

## 2020-02-11 DIAGNOSIS — F12.90: ICD-10-CM

## 2020-02-11 DIAGNOSIS — K21.9: ICD-10-CM

## 2020-02-11 DIAGNOSIS — Z88.8: ICD-10-CM

## 2020-02-11 DIAGNOSIS — I10: Primary | ICD-10-CM

## 2020-02-11 DIAGNOSIS — F17.200: ICD-10-CM

## 2020-02-11 DIAGNOSIS — Z90.89: ICD-10-CM

## 2020-02-11 LAB
ALBUMIN SERPL-MCNC: 3.9 GM/DL (ref 3.1–4.5)
ALP SERPL-CCNC: 74 U/L (ref 45–117)
ALT SERPL W P-5'-P-CCNC: 26 U/L (ref 12–78)
AST SERPL-CCNC: 12 IU/L (ref 3–35)
BASOPHILS # BLD AUTO: 2 % (ref 0–1)
BUN SERPL-MCNC: 18 MG/DL (ref 7–24)
BURR CELLS BLD QL SMEAR: (no result)
CHLORIDE SERPL-SCNC: 101 MMOL/L (ref 98–107)
CREAT SERPL-MCNC: 1.45 MG/DL (ref 0.7–1.3)
ERYTHROCYTE [DISTWIDTH] IN BLOOD BY AUTOMATED COUNT: 14.4 % (ref 0–14.5)
HCT VFR BLD AUTO: 41.8 % (ref 42–52)
HGB BLD-MCNC: 14.1 G/DL (ref 14–18)
MCH RBC QN AUTO: 31.3 PG (ref 27–31)
MCHC RBC AUTO-ENTMCNC: 33.7 G/DL (ref 33–37)
MCV RBC AUTO: 92.7 FL (ref 80–94)
NRBC BLD QL AUTO: 0 10*3/UL (ref 0–0)
PLATELET # BLD AUTO: 343 10*3/UL (ref 130–400)
PLATELET SUFFICIENCY: NORMAL
PMV BLD AUTO: 9.5 FL (ref 9.6–12.3)
POTASSIUM SERPL-SCNC: 4.1 MMOL/L (ref 3.5–5.1)
PROT SERPL-MCNC: 8 GM/DL (ref 6.4–8.2)
RBC # BLD AUTO: 4.51 10*6/UL (ref 4.5–5.9)
SODIUM SERPL-SCNC: 132 MMOL/L (ref 136–145)
TOTAL CELLS COUNTED: 100 #CELLS
VARIANT LYMPHS NFR BLD MANUAL: 1 % (ref 0–0)
WBC NRBC COR # BLD AUTO: 14.1 10*3/UL (ref 4.8–10.8)

## 2020-06-29 ENCOUNTER — HOSPITAL ENCOUNTER (OUTPATIENT)
Dept: HOSPITAL 83 - RESCLI | Age: 65
Discharge: HOME | End: 2020-06-29
Attending: STUDENT IN AN ORGANIZED HEALTH CARE EDUCATION/TRAINING PROGRAM
Payer: COMMERCIAL

## 2020-06-29 DIAGNOSIS — G62.9: ICD-10-CM

## 2020-06-29 DIAGNOSIS — Z12.12: ICD-10-CM

## 2020-06-29 DIAGNOSIS — M15.9: ICD-10-CM

## 2020-06-29 DIAGNOSIS — Z79.899: ICD-10-CM

## 2020-06-29 DIAGNOSIS — F17.200: ICD-10-CM

## 2020-06-29 DIAGNOSIS — K21.9: ICD-10-CM

## 2020-06-29 DIAGNOSIS — I10: Primary | ICD-10-CM

## 2020-06-29 DIAGNOSIS — Z98.890: ICD-10-CM

## 2020-06-29 DIAGNOSIS — Z88.8: ICD-10-CM

## 2020-06-30 ENCOUNTER — HOSPITAL ENCOUNTER (OUTPATIENT)
Dept: HOSPITAL 83 - LAB | Age: 65
Discharge: HOME | End: 2020-06-30
Attending: INTERNAL MEDICINE
Payer: COMMERCIAL

## 2020-06-30 DIAGNOSIS — I10: Primary | ICD-10-CM

## 2020-06-30 LAB
ALBUMIN SERPL-MCNC: 3.8 GM/DL (ref 3.1–4.5)
ALP SERPL-CCNC: 77 U/L (ref 45–117)
ALT SERPL W P-5'-P-CCNC: 19 U/L (ref 12–78)
AST SERPL-CCNC: 12 IU/L (ref 3–35)
BUN SERPL-MCNC: 21 MG/DL (ref 7–24)
CHLORIDE SERPL-SCNC: 106 MMOL/L (ref 98–107)
CHOLEST SERPL-MCNC: 213 MG/DL (ref ?–200)
CREAT SERPL-MCNC: 1.5 MG/DL (ref 0.7–1.3)
ERYTHROCYTE [DISTWIDTH] IN BLOOD BY AUTOMATED COUNT: 13.7 % (ref 0–14.5)
HCT VFR BLD AUTO: 43.8 % (ref 42–52)
HDLC SERPL-MCNC: 43 MG/DL (ref 40–60)
LDLC SERPL DIRECT ASSAY-MCNC: 146 MG/DL (ref 9–159)
MCH RBC QN AUTO: 30.5 PG (ref 27–31)
MCHC RBC AUTO-ENTMCNC: 34 G/DL (ref 33–37)
MCV RBC AUTO: 89.6 FL (ref 80–94)
NRBC BLD QL AUTO: 0 10*3/UL (ref 0–0)
PLATELET # BLD AUTO: 358 10*3/UL (ref 130–400)
PLATELET SUFFICIENCY: NORMAL
PMV BLD AUTO: 9 FL (ref 9.6–12.3)
POTASSIUM SERPL-SCNC: 4.2 MMOL/L (ref 3.5–5.1)
PROT SERPL-MCNC: 8.2 GM/DL (ref 6.4–8.2)
RBC # BLD AUTO: 4.89 10*6/UL (ref 4.5–5.9)
RBC MORPH BLD: NORMAL
SODIUM SERPL-SCNC: 135 MMOL/L (ref 136–145)
TOTAL CELLS COUNTED: 100 #CELLS
TRIGL SERPL-MCNC: 119 MG/DL (ref ?–150)
VLDLC SERPL CALC-MCNC: 24 MG/DL (ref 6–40)
WBC NRBC COR # BLD AUTO: 15.4 10*3/UL (ref 4.8–10.8)

## 2020-07-14 ENCOUNTER — HOSPITAL ENCOUNTER (OUTPATIENT)
Dept: HOSPITAL 83 - CT | Age: 65
Discharge: HOME | End: 2020-07-14
Attending: STUDENT IN AN ORGANIZED HEALTH CARE EDUCATION/TRAINING PROGRAM
Payer: COMMERCIAL

## 2020-07-14 DIAGNOSIS — J43.9: Primary | ICD-10-CM

## 2020-07-14 DIAGNOSIS — I25.10: ICD-10-CM

## 2020-07-14 DIAGNOSIS — J84.9: ICD-10-CM

## 2021-01-22 ENCOUNTER — HOSPITAL ENCOUNTER (OUTPATIENT)
Dept: HOSPITAL 83 - RESCLI | Age: 66
Discharge: HOME | End: 2021-01-22
Attending: STUDENT IN AN ORGANIZED HEALTH CARE EDUCATION/TRAINING PROGRAM
Payer: COMMERCIAL

## 2021-01-22 DIAGNOSIS — Z71.6: ICD-10-CM

## 2021-01-22 DIAGNOSIS — E78.5: ICD-10-CM

## 2021-01-22 DIAGNOSIS — Z72.0: ICD-10-CM

## 2021-01-22 DIAGNOSIS — Z79.899: ICD-10-CM

## 2021-01-22 DIAGNOSIS — Z88.8: ICD-10-CM

## 2021-01-22 DIAGNOSIS — K21.9: ICD-10-CM

## 2021-01-22 DIAGNOSIS — I10: Primary | ICD-10-CM

## 2021-01-26 ENCOUNTER — HOSPITAL ENCOUNTER (OUTPATIENT)
Dept: HOSPITAL 83 - LAB | Age: 66
Discharge: HOME | End: 2021-01-26
Attending: INTERNAL MEDICINE
Payer: COMMERCIAL

## 2021-01-26 DIAGNOSIS — E78.5: ICD-10-CM

## 2021-01-26 DIAGNOSIS — I10: Primary | ICD-10-CM

## 2021-01-26 LAB
ALBUMIN SERPL-MCNC: 4 GM/DL (ref 3.1–4.5)
ALP SERPL-CCNC: 74 U/L (ref 45–117)
ALT SERPL W P-5'-P-CCNC: 25 U/L (ref 12–78)
AST SERPL-CCNC: 15 IU/L (ref 3–35)
BASOPHILS # BLD AUTO: 1 % (ref 0–1)
BUN SERPL-MCNC: 22 MG/DL (ref 7–24)
CHLORIDE SERPL-SCNC: 102 MMOL/L (ref 98–107)
CHOLEST SERPL-MCNC: 239 MG/DL (ref ?–200)
CREAT SERPL-MCNC: 1.55 MG/DL (ref 0.7–1.3)
ERYTHROCYTE [DISTWIDTH] IN BLOOD BY AUTOMATED COUNT: 13.4 % (ref 0–14.5)
HCT VFR BLD AUTO: 41.6 % (ref 42–52)
HDLC SERPL-MCNC: 43 MG/DL (ref 40–60)
LDLC SERPL DIRECT ASSAY-MCNC: 165 MG/DL (ref 9–159)
MCH RBC QN AUTO: 31 PG (ref 27–31)
MCHC RBC AUTO-ENTMCNC: 34.4 G/DL (ref 33–37)
MCV RBC AUTO: 90 FL (ref 80–94)
NRBC BLD QL AUTO: 0 10*3/UL (ref 0–0)
PLATELET # BLD AUTO: 313 10*3/UL (ref 130–400)
PLATELET SUFFICIENCY: NORMAL
PMV BLD AUTO: 9.1 FL (ref 9.6–12.3)
POTASSIUM SERPL-SCNC: 3.9 MMOL/L (ref 3.5–5.1)
PROT SERPL-MCNC: 8.2 GM/DL (ref 6.4–8.2)
RBC # BLD AUTO: 4.62 10*6/UL (ref 4.5–5.9)
RBC MORPH BLD: NORMAL
SODIUM SERPL-SCNC: 131 MMOL/L (ref 136–145)
TOTAL CELLS COUNTED: 100 #CELLS
TRIGL SERPL-MCNC: 153 MG/DL (ref ?–150)
VLDLC SERPL CALC-MCNC: 31 MG/DL (ref 6–40)
WBC NRBC COR # BLD AUTO: 14.2 10*3/UL (ref 4.8–10.8)

## 2021-03-11 ENCOUNTER — HOSPITAL ENCOUNTER (OUTPATIENT)
Dept: HOSPITAL 83 - RAD | Age: 66
Discharge: HOME | End: 2021-03-11
Attending: CHIROPRACTOR
Payer: COMMERCIAL

## 2021-03-11 DIAGNOSIS — M25.78: ICD-10-CM

## 2021-03-11 DIAGNOSIS — M48.02: ICD-10-CM

## 2021-03-11 DIAGNOSIS — M46.02: ICD-10-CM

## 2021-03-11 DIAGNOSIS — M50.323: Primary | ICD-10-CM

## 2021-05-19 ENCOUNTER — HOSPITAL ENCOUNTER (OUTPATIENT)
Dept: HOSPITAL 83 - RESCLI | Age: 66
Discharge: HOME | End: 2021-05-19
Attending: STUDENT IN AN ORGANIZED HEALTH CARE EDUCATION/TRAINING PROGRAM
Payer: COMMERCIAL

## 2021-05-19 DIAGNOSIS — Z72.0: ICD-10-CM

## 2021-05-19 DIAGNOSIS — H16.132: ICD-10-CM

## 2021-05-19 DIAGNOSIS — Z98.890: ICD-10-CM

## 2021-05-19 DIAGNOSIS — G44.52: ICD-10-CM

## 2021-05-19 DIAGNOSIS — E78.2: Primary | ICD-10-CM

## 2021-05-19 DIAGNOSIS — K21.9: ICD-10-CM

## 2021-05-19 DIAGNOSIS — Z79.899: ICD-10-CM

## 2021-05-19 DIAGNOSIS — N18.31: ICD-10-CM

## 2021-05-19 DIAGNOSIS — I12.9: ICD-10-CM

## 2021-05-19 DIAGNOSIS — Z88.8: ICD-10-CM

## 2021-06-21 ENCOUNTER — HOSPITAL ENCOUNTER (OUTPATIENT)
Dept: HOSPITAL 83 - LAB | Age: 66
Discharge: HOME | End: 2021-06-21
Attending: STUDENT IN AN ORGANIZED HEALTH CARE EDUCATION/TRAINING PROGRAM
Payer: COMMERCIAL

## 2021-06-21 DIAGNOSIS — N18.31: Primary | ICD-10-CM

## 2021-06-21 LAB
BACTERIA #/AREA URNS HPF: (no result) /[HPF]
BUN SERPL-MCNC: 15 MG/DL (ref 7–24)
CHLORIDE SERPL-SCNC: 98 MMOL/L (ref 98–107)
CREAT SERPL-MCNC: 1.61 MG/DL (ref 0.7–1.3)
EPI CELLS #/AREA URNS HPF: (no result) /[HPF]
PH UR STRIP: 7.5 [PH] (ref 4.5–8)
POTASSIUM SERPL-SCNC: 3.9 MMOL/L (ref 3.5–5.1)
RBC #/AREA URNS HPF: (no result) RBC/HPF (ref 0–2)
SODIUM SERPL-SCNC: 133 MMOL/L (ref 136–145)
SP GR UR: 1.01 (ref 1–1.03)
UROBILINOGEN UR STRIP-MCNC: 1 E.U./DL (ref 0–1)
WBC #/AREA URNS HPF: (no result) WBC/HPF (ref 0–5)

## 2021-06-22 ENCOUNTER — HOSPITAL ENCOUNTER (OUTPATIENT)
Dept: HOSPITAL 83 - RESCLI | Age: 66
Discharge: HOME | End: 2021-06-22
Attending: STUDENT IN AN ORGANIZED HEALTH CARE EDUCATION/TRAINING PROGRAM
Payer: COMMERCIAL

## 2021-06-22 DIAGNOSIS — Z79.899: ICD-10-CM

## 2021-06-22 DIAGNOSIS — K21.9: ICD-10-CM

## 2021-06-22 DIAGNOSIS — E55.9: ICD-10-CM

## 2021-06-22 DIAGNOSIS — Z72.0: ICD-10-CM

## 2021-06-22 DIAGNOSIS — H16.132: ICD-10-CM

## 2021-06-22 DIAGNOSIS — I10: ICD-10-CM

## 2021-06-22 DIAGNOSIS — E78.2: ICD-10-CM

## 2021-06-22 DIAGNOSIS — R73.9: Primary | ICD-10-CM

## 2022-05-13 ENCOUNTER — HOSPITAL ENCOUNTER (OUTPATIENT)
Dept: HOSPITAL 83 - RESCLI | Age: 67
Discharge: HOME | End: 2022-05-13
Attending: INTERNAL MEDICINE
Payer: COMMERCIAL

## 2022-05-13 DIAGNOSIS — E78.2: ICD-10-CM

## 2022-05-13 DIAGNOSIS — I10: ICD-10-CM

## 2022-05-13 DIAGNOSIS — Z72.0: ICD-10-CM

## 2022-05-13 DIAGNOSIS — K21.9: ICD-10-CM

## 2022-05-13 DIAGNOSIS — Z13.6: ICD-10-CM

## 2022-05-13 DIAGNOSIS — Z12.5: ICD-10-CM

## 2022-05-13 DIAGNOSIS — Z71.6: ICD-10-CM

## 2022-05-13 DIAGNOSIS — Z79.899: ICD-10-CM

## 2022-05-13 DIAGNOSIS — L02.214: Primary | ICD-10-CM

## 2022-05-13 DIAGNOSIS — Z12.2: ICD-10-CM

## 2022-10-20 ENCOUNTER — HOSPITAL ENCOUNTER (OUTPATIENT)
Dept: HOSPITAL 83 - RESCLI | Age: 67
Discharge: HOME | End: 2022-10-20
Attending: EMERGENCY MEDICINE
Payer: COMMERCIAL

## 2022-10-20 DIAGNOSIS — L02.214: ICD-10-CM

## 2022-10-20 DIAGNOSIS — F17.200: ICD-10-CM

## 2022-10-20 DIAGNOSIS — G89.29: ICD-10-CM

## 2022-10-20 DIAGNOSIS — Z71.6: ICD-10-CM

## 2022-10-20 DIAGNOSIS — K21.9: ICD-10-CM

## 2022-10-20 DIAGNOSIS — Z23: Primary | ICD-10-CM

## 2022-10-20 DIAGNOSIS — J43.9: ICD-10-CM

## 2022-10-20 DIAGNOSIS — E55.9: ICD-10-CM

## 2022-10-20 DIAGNOSIS — M54.9: ICD-10-CM

## 2022-10-20 DIAGNOSIS — Z79.899: ICD-10-CM

## 2022-10-20 DIAGNOSIS — I12.9: ICD-10-CM

## 2022-10-20 DIAGNOSIS — Z13.6: ICD-10-CM

## 2022-10-20 DIAGNOSIS — E78.2: ICD-10-CM

## 2022-10-20 DIAGNOSIS — F32.9: ICD-10-CM

## 2022-10-20 DIAGNOSIS — F12.10: ICD-10-CM

## 2022-10-20 DIAGNOSIS — N18.31: ICD-10-CM

## 2022-10-31 ENCOUNTER — HOSPITAL ENCOUNTER (OUTPATIENT)
Dept: HOSPITAL 83 - LAB | Age: 67
Discharge: HOME | End: 2022-10-31
Attending: INTERNAL MEDICINE
Payer: COMMERCIAL

## 2022-10-31 DIAGNOSIS — I10: Primary | ICD-10-CM

## 2022-10-31 DIAGNOSIS — Z79.899: ICD-10-CM

## 2022-10-31 DIAGNOSIS — E78.2: ICD-10-CM

## 2022-10-31 DIAGNOSIS — Z12.5: ICD-10-CM

## 2022-10-31 DIAGNOSIS — E55.9: ICD-10-CM

## 2022-10-31 LAB
ALP SERPL-CCNC: 96 U/L (ref 45–117)
ALT SERPL W P-5'-P-CCNC: 24 U/L (ref 12–78)
AST SERPL-CCNC: 15 IU/L (ref 3–35)
BASOPHILS # BLD AUTO: 1 % (ref 0–1)
BUN SERPL-MCNC: 18 MG/DL (ref 7–24)
BURR CELLS BLD QL SMEAR: (no result)
CHLORIDE SERPL-SCNC: 104 MMOL/L (ref 98–107)
CHOLEST SERPL-MCNC: 136 MG/DL (ref ?–200)
CREAT SERPL-MCNC: 1.51 MG/DL (ref 0.7–1.3)
ERYTHROCYTE [DISTWIDTH] IN BLOOD BY AUTOMATED COUNT: 13.8 % (ref 0–14.5)
HCT VFR BLD AUTO: 40.9 % (ref 42–52)
LDLC SERPL DIRECT ASSAY-MCNC: 68 MG/DL (ref 9–159)
MANUAL DIFFERENTIAL PERFORMED BLD QL: YES
MCH RBC QN AUTO: 31.6 PG (ref 27–31)
MCHC RBC AUTO-ENTMCNC: 35 G/DL (ref 33–37)
MCV RBC AUTO: 90.3 FL (ref 80–94)
NRBC BLD QL AUTO: 0 10*3/UL (ref 0–0)
PLATELET # BLD AUTO: 316 10*3/UL (ref 130–400)
PLATELET SUFFICIENCY: NORMAL
PMV BLD AUTO: 9.2 FL (ref 9.6–12.3)
POLYCHROMASIA BLD QL SMEAR: SLIGHT
POTASSIUM SERPL-SCNC: 4 MMOL/L (ref 3.5–5.1)
PROT SERPL-MCNC: 8 GM/DL (ref 6.4–8.2)
RBC # BLD AUTO: 4.53 10*6/UL (ref 4.5–5.9)
SCHISTOCYTES BLD QL SMEAR: (no result)
SODIUM SERPL-SCNC: 133 MMOL/L (ref 136–145)
TARGETS BLD QL SMEAR: (no result)
TOTAL CELLS COUNTED: 100 #CELLS
TRIGL SERPL-MCNC: 107 MG/DL (ref ?–150)
VARIANT LYMPHS NFR BLD MANUAL: 4 % (ref 0–0)
WBC NRBC COR # BLD AUTO: 11.7 10*3/UL (ref 4.8–10.8)

## 2022-11-21 ENCOUNTER — HOSPITAL ENCOUNTER (OUTPATIENT)
Dept: HOSPITAL 83 - RESCLI | Age: 67
Discharge: HOME | End: 2022-11-21
Attending: INTERNAL MEDICINE
Payer: COMMERCIAL

## 2022-11-21 DIAGNOSIS — E55.9: ICD-10-CM

## 2022-11-21 DIAGNOSIS — E78.2: ICD-10-CM

## 2022-11-21 DIAGNOSIS — N18.31: ICD-10-CM

## 2022-11-21 DIAGNOSIS — Z88.5: ICD-10-CM

## 2022-11-21 DIAGNOSIS — Z98.890: ICD-10-CM

## 2022-11-21 DIAGNOSIS — I12.9: Primary | ICD-10-CM

## 2022-11-21 DIAGNOSIS — Z79.899: ICD-10-CM

## 2022-11-21 DIAGNOSIS — K21.9: ICD-10-CM

## 2023-12-18 ENCOUNTER — HOSPITAL ENCOUNTER (OUTPATIENT)
Dept: HOSPITAL 83 - RESCLI | Age: 68
Discharge: HOME | End: 2023-12-18
Attending: INTERNAL MEDICINE
Payer: COMMERCIAL

## 2023-12-18 DIAGNOSIS — R73.03: ICD-10-CM

## 2023-12-18 DIAGNOSIS — E78.2: ICD-10-CM

## 2023-12-18 DIAGNOSIS — N18.31: Primary | ICD-10-CM

## 2023-12-18 LAB
ALP SERPL-CCNC: 90 U/L (ref 46–116)
ALT SERPL W P-5'-P-CCNC: 16 U/L (ref 5–49)
BUN SERPL-MCNC: 13 MG/DL (ref 9–23)
BURR CELLS BLD QL SMEAR: (no result)
CHLORIDE SERPL-SCNC: 96 MMOL/L (ref 98–107)
CHOLEST SERPL-MCNC: 218 MG/DL (ref ?–200)
ERYTHROCYTE [DISTWIDTH] IN BLOOD BY AUTOMATED COUNT: 13.1 % (ref 0–14.5)
HCT VFR BLD AUTO: 45.1 % (ref 42–52)
LDLC SERPL DIRECT ASSAY-MCNC: 138 MG/DL (ref 9–159)
MANUAL DIFFERENTIAL PERFORMED BLD QL: YES
MCH RBC QN AUTO: 32.3 PG (ref 27–31)
MCHC RBC AUTO-ENTMCNC: 34.8 G/DL (ref 33–37)
MCV RBC AUTO: 92.8 FL (ref 80–94)
NRBC BLD QL AUTO: 0 % (ref 0–0)
OVALOCYTES BLD QL SMEAR: (no result)
PLATELET # BLD AUTO: 341 10*3/UL (ref 130–400)
PLATELET SUFFICIENCY: NORMAL
PMV BLD AUTO: 8.9 FL (ref 9.6–12.3)
POTASSIUM SERPL-SCNC: 4.4 MMOL/L (ref 3.4–5.1)
PROT SERPL-MCNC: 8.2 GM/DL (ref 6–8)
RBC # BLD AUTO: 4.86 10*6/UL (ref 4.5–5.9)
TOTAL CELLS COUNTED: 100 #CELLS
TRIGL SERPL-MCNC: 136 MG/DL (ref ?–150)
VARIANT LYMPHS NFR BLD MANUAL: 8 % (ref 0–0)
WBC NRBC COR # BLD AUTO: 12.4 10*3/UL (ref 4.8–10.8)

## 2023-12-22 ENCOUNTER — HOSPITAL ENCOUNTER (OUTPATIENT)
Dept: HOSPITAL 83 - LAB | Age: 68
Discharge: HOME | End: 2023-12-22
Payer: COMMERCIAL

## 2023-12-22 DIAGNOSIS — D72.820: ICD-10-CM

## 2023-12-22 DIAGNOSIS — N18.31: Primary | ICD-10-CM

## 2023-12-22 DIAGNOSIS — E78.2: ICD-10-CM

## 2023-12-22 DIAGNOSIS — R73.03: ICD-10-CM

## 2023-12-22 LAB
ALP SERPL-CCNC: 92 U/L (ref 46–116)
ALT SERPL W P-5'-P-CCNC: 17 U/L (ref 5–49)
BASOPHILS # BLD AUTO: 1 % (ref 0–1)
BUN SERPL-MCNC: 14 MG/DL (ref 9–23)
CHLORIDE SERPL-SCNC: 97 MMOL/L (ref 98–107)
CHOLEST SERPL-MCNC: 233 MG/DL (ref ?–200)
CREAT UR-MCNC: 41.02 MG/DL
ERYTHROCYTE [DISTWIDTH] IN BLOOD BY AUTOMATED COUNT: 13.2 % (ref 0–14.5)
HCT VFR BLD AUTO: 45.5 % (ref 42–52)
LDLC SERPL DIRECT ASSAY-MCNC: 145 MG/DL (ref 9–159)
MANUAL DIFFERENTIAL PERFORMED BLD QL: YES
MCH RBC QN AUTO: 32.1 PG (ref 27–31)
MCHC RBC AUTO-ENTMCNC: 34.9 G/DL (ref 33–37)
MCV RBC AUTO: 91.7 FL (ref 80–94)
NRBC BLD QL AUTO: 0 % (ref 0–0)
PLATELET # BLD AUTO: 337 10*3/UL (ref 130–400)
PLATELET SUFFICIENCY: NORMAL
PMV BLD AUTO: 9 FL (ref 9.6–12.3)
POTASSIUM SERPL-SCNC: 4.1 MMOL/L (ref 3.4–5.1)
PROT SERPL-MCNC: 8.3 GM/DL (ref 6–8)
RBC # BLD AUTO: 4.96 10*6/UL (ref 4.5–5.9)
RBC MORPH BLD: NORMAL
TOTAL CELLS COUNTED: 100 #CELLS
TRIGL SERPL-MCNC: 201 MG/DL (ref ?–150)
WBC NRBC COR # BLD AUTO: 12.8 10*3/UL (ref 4.8–10.8)

## 2023-12-23 LAB — PROT SERPL-MCNC: 7.7 G/DL (ref 6–8.5)

## 2024-08-26 ENCOUNTER — HOSPITAL ENCOUNTER (OUTPATIENT)
Dept: HOSPITAL 83 - RESCLI | Age: 69
Discharge: HOME | End: 2024-08-26
Attending: INTERNAL MEDICINE
Payer: COMMERCIAL

## 2024-08-26 DIAGNOSIS — R39.11: ICD-10-CM

## 2024-08-26 DIAGNOSIS — E78.2: ICD-10-CM

## 2024-08-26 DIAGNOSIS — K21.9: ICD-10-CM

## 2024-08-26 DIAGNOSIS — I12.9: Primary | ICD-10-CM

## 2024-08-26 DIAGNOSIS — Z88.8: ICD-10-CM

## 2024-08-26 DIAGNOSIS — Z98.890: ICD-10-CM

## 2024-08-26 DIAGNOSIS — D47.2: ICD-10-CM

## 2024-08-26 DIAGNOSIS — N18.31: ICD-10-CM

## 2024-08-26 DIAGNOSIS — Z79.899: ICD-10-CM

## 2024-08-26 DIAGNOSIS — F17.210: ICD-10-CM

## 2024-08-26 DIAGNOSIS — R73.03: ICD-10-CM

## 2024-08-26 DIAGNOSIS — E55.9: ICD-10-CM

## 2024-08-26 LAB
ALP SERPL-CCNC: 107 U/L (ref 46–116)
ALT SERPL W P-5'-P-CCNC: 16 U/L (ref 5–49)
BASOPHILS # BLD AUTO: 0.1 10*3/UL (ref 0–0.1)
BASOPHILS NFR BLD AUTO: 0.9 % (ref 0–1)
BUN SERPL-MCNC: 11 MG/DL (ref 9–23)
CHLORIDE SERPL-SCNC: 100 MMOL/L (ref 98–107)
CHOLEST SERPL-MCNC: 137 MG/DL (ref ?–200)
EOSINOPHIL # BLD AUTO: 0.4 10*3/UL (ref 0–0.4)
EOSINOPHIL # BLD AUTO: 3.5 % (ref 1–4)
ERYTHROCYTE [DISTWIDTH] IN BLOOD BY AUTOMATED COUNT: 13.6 % (ref 0–14.5)
HCT VFR BLD AUTO: 43.4 % (ref 42–52)
LDLC SERPL DIRECT ASSAY-MCNC: 60 MG/DL (ref 9–159)
LYMPHOCYTES # BLD AUTO: 4.3 10*3/UL (ref 1.3–4.4)
LYMPHOCYTES NFR BLD AUTO: 40.1 % (ref 27–41)
MCH RBC QN AUTO: 32.8 PG (ref 27–31)
MCHC RBC AUTO-ENTMCNC: 34.6 G/DL (ref 33–37)
MCV RBC AUTO: 94.8 FL (ref 80–94)
MONOCYTES # BLD AUTO: 0.8 10*3/UL (ref 0.1–1)
MONOCYTES NFR BLD MANUAL: 7.4 % (ref 3–9)
NEUT #: 5.1 10*3/UL (ref 2.3–7.9)
NEUT %: 47.8 % (ref 47–73)
NRBC BLD QL AUTO: 0 % (ref 0–0)
PLATELET # BLD AUTO: 275 10*3/UL (ref 130–400)
PMV BLD AUTO: 9.1 FL (ref 9.6–12.3)
POTASSIUM SERPL-SCNC: 3.9 MMOL/L (ref 3.4–5.1)
PROT SERPL-MCNC: 7.8 GM/DL (ref 6–8)
RBC # BLD AUTO: 4.58 10*6/UL (ref 4.5–5.9)
TRIGL SERPL-MCNC: 118 MG/DL (ref ?–150)
WBC NRBC COR # BLD AUTO: 10.6 10*3/UL (ref 4.8–10.8)

## 2024-08-27 LAB
KAPPA LC FREE SER-MCNC: 37.3 MG/L (ref 3.3–19.4)
KAPPA LC FREE/LAMBDA FREE SER: 1.41 {RATIO} (ref 0.26–1.65)
LAMBDA LC FREE SERPL-MCNC: 26.4 MG/L (ref 5.7–26.3)

## 2024-09-09 ENCOUNTER — HOSPITAL ENCOUNTER (OUTPATIENT)
Dept: HOSPITAL 83 - LAB | Age: 69
Discharge: HOME | End: 2024-09-09
Attending: STUDENT IN AN ORGANIZED HEALTH CARE EDUCATION/TRAINING PROGRAM
Payer: COMMERCIAL

## 2024-09-09 DIAGNOSIS — E83.52: ICD-10-CM

## 2024-09-09 DIAGNOSIS — D47.2: Primary | ICD-10-CM

## 2024-09-11 LAB
ALBUMIN MFR UR ELPH: 25.5 %
ALPHA1 GLOB MFR UR ELPH: 7.3 %
ALPHA2 GLOB 24H MFR UR ELPH: 19.1 %
B-GLOBULIN 24H MFR UR ELPH: 29.2 %
GAMMA GLOB 24H MFR UR ELPH: 19 %
PROT UR-MCNC: 4.9 MG/DL

## 2025-06-02 ENCOUNTER — HOSPITAL ENCOUNTER (OUTPATIENT)
Dept: HOSPITAL 83 - RESCLI | Age: 70
Discharge: HOME | End: 2025-06-02
Attending: INTERNAL MEDICINE
Payer: COMMERCIAL

## 2025-06-02 DIAGNOSIS — F32.9: ICD-10-CM

## 2025-06-02 DIAGNOSIS — E78.2: ICD-10-CM

## 2025-06-02 DIAGNOSIS — N18.31: ICD-10-CM

## 2025-06-02 DIAGNOSIS — K21.9: ICD-10-CM

## 2025-06-02 DIAGNOSIS — I12.9: Primary | ICD-10-CM

## 2025-06-02 LAB
ACANTHOCYTES BLD QL SMEAR: (no result)
ALP SERPL-CCNC: 85 U/L (ref 46–116)
ALT SERPL W P-5'-P-CCNC: 17 U/L (ref 5–49)
AUER BODIES BLD QL SMEAR: (no result)
BASO STIPL BLD QL SMEAR: (no result)
BASOPHILS # BLD AUTO: (no result) 10*3/UL (ref 0–0.1)
BASOPHILS # BLD AUTO: 1 % (ref 0–1)
BASOPHILS NFR BLD AUTO: (no result) % (ref 0–1)
BLASTS # BLD MANUAL: (no result) % (ref 0–0)
BUN SERPL-MCNC: 17 MG/DL (ref 9–23)
BURR CELLS BLD QL SMEAR: (no result)
CHLORIDE SERPL-SCNC: 100 MMOL/L (ref 98–107)
CHOLEST SERPL-MCNC: 144 MG/DL (ref ?–200)
DIFFERENTIAL COMMENT: (no result)
DOHLE BOD BLD QL SMEAR: (no result)
EOSINOPHIL # BLD AUTO: (no result) % (ref 1–4)
EOSINOPHIL # BLD AUTO: (no result) 10*3/UL (ref 0–0.4)
ERYTHROCYTE [DISTWIDTH] IN BLOOD BY AUTOMATED COUNT: 14 % (ref 0–14.5)
HCT VFR BLD AUTO: 41 % (ref 42–52)
HOWELL-JOLLY BOD BLD QL SMEAR: (no result)
LDLC SERPL DIRECT ASSAY-MCNC: 69 MG/DL (ref 9–159)
MACROCYTES BLD QL SMEAR: (no result)
MANUAL DIFFERENTIAL PERFORMED BLD QL: YES
MCH RBC QN AUTO: 32.5 PG (ref 27–31)
MCHC RBC AUTO-ENTMCNC: 34.6 G/DL (ref 33–37)
MCV RBC AUTO: 93.8 FL (ref 80–94)
MICROCYTES BLD QL SMEAR: (no result)
MONOCYTES # BLD AUTO: (no result) 10*3/UL (ref 0.1–1)
MONOCYTES NFR BLD MANUAL: (no result) % (ref 3–9)
NEUT #: (no result) 10*3/UL (ref 2.3–7.9)
NEUTROPHILS NFR BLD AUTO: (no result) % (ref 47–73)
NRBC BLD QL AUTO: 0 % (ref 0–0)
OVALOCYTES BLD QL SMEAR: (no result)
PLASMA CELLS # BLD MANUAL: (no result) % (ref 0–0)
PLATELET # BLD AUTO: 334 10*3/UL (ref 130–400)
PLATELET SUFFICIENCY: NORMAL
PMV BLD AUTO: 9.1 FL (ref 9.6–12.3)
POLYCHROMASIA BLD QL SMEAR: SLIGHT
POTASSIUM SERPL-SCNC: 4.3 MMOL/L (ref 3.4–5.1)
PROT SERPL-MCNC: 7.4 GM/DL (ref 6–8)
RBC # BLD AUTO: 4.37 10*6/UL (ref 4.5–5.9)
RBC MORPH BLD: (no result)
ROULEAUX BLD QL SMEAR: (no result)
SCHISTOCYTES BLD QL SMEAR: (no result)
SPHEROCYTES BLD QL SMEAR: (no result)
STOMATOCYTES BLD QL SMEAR: (no result)
TARGETS BLD QL SMEAR: (no result)
TOTAL CELLS COUNTED: 100 #CELLS
TOXIC GRANULES BLD QL SMEAR: (no result)
TRIGL SERPL-MCNC: 70 MG/DL (ref ?–150)
VACUOLATION OF NEUTROPHILS: (no result)
VARIANT LYMPHS NFR BLD MANUAL: 6 % (ref 0–0)
WBC MORPH BLD: (no result)
WBC NRBC COR # BLD AUTO: 11.1 10*3/UL (ref 4.8–10.8)